# Patient Record
Sex: FEMALE | Race: WHITE | Employment: OTHER | URBAN - METROPOLITAN AREA
[De-identification: names, ages, dates, MRNs, and addresses within clinical notes are randomized per-mention and may not be internally consistent; named-entity substitution may affect disease eponyms.]

---

## 2021-05-15 ENCOUNTER — HOSPITAL ENCOUNTER (OUTPATIENT)
Age: 76
Discharge: HOME | End: 2021-05-15
Payer: MEDICARE

## 2021-05-15 VITALS
HEART RATE: 98 BPM | DIASTOLIC BLOOD PRESSURE: 76 MMHG | RESPIRATION RATE: 20 BRPM | SYSTOLIC BLOOD PRESSURE: 158 MMHG | TEMPERATURE: 98 F | OXYGEN SATURATION: 98 %

## 2021-05-15 DIAGNOSIS — B37.0 ORAL THRUSH: Primary | ICD-10-CM

## 2021-05-15 PROCEDURE — 99203 OFFICE O/P NEW LOW 30 MIN: CPT

## 2021-05-15 RX ORDER — CLOTRIMAZOLE 10 MG/1
10 LOZENGE ORAL; TOPICAL
Qty: 70 LOZENGE | Refills: 0 | Status: SHIPPED | OUTPATIENT
Start: 2021-05-15 | End: 2021-05-29

## 2021-05-15 NOTE — ED PROVIDER NOTES
Patient Seen in: [facility]      History   Patient presents with:  Mouth/Lip Problem    Stated Complaint: coated tongue    HPI/Subjective:   HPI    This is a 42-year-old female with no significant past medical history who presents with a vicky Mucous membranes are moist.      Tongue: Lesions present. Palate: No mass and lesions. Pharynx: Oropharynx is clear. Uvula midline. Tonsils: No tonsillar exudate. Comments: +white patches to the tongue.  Redness and irritation to the ton distress and cleared for home.       Disposition and Plan     Clinical Impression:  Oral thrush  (primary encounter diagnosis)     Disposition:  Discharge  5/15/2021  9:30 am    Follow-up:  Primary Care Provider  SEE LIST              Medications Prescribed